# Patient Record
Sex: FEMALE | Race: WHITE | NOT HISPANIC OR LATINO | Employment: OTHER | URBAN - METROPOLITAN AREA
[De-identification: names, ages, dates, MRNs, and addresses within clinical notes are randomized per-mention and may not be internally consistent; named-entity substitution may affect disease eponyms.]

---

## 2018-04-05 PROBLEM — E03.9 HYPOTHYROIDISM IN ADULT: Status: ACTIVE | Noted: 2018-04-05

## 2018-04-05 PROBLEM — K22.2 ESOPHAGEAL STRICTURE: Status: ACTIVE | Noted: 2018-04-05

## 2018-04-05 PROBLEM — E55.9 VITAMIN D DEFICIENCY: Status: ACTIVE | Noted: 2018-04-05

## 2018-08-17 PROBLEM — R13.14 DYSPHAGIA, PHARYNGOESOPHAGEAL: Status: ACTIVE | Noted: 2018-08-17

## 2019-11-21 PROBLEM — E03.9 HYPOTHYROIDISM IN ADULT: Chronic | Status: ACTIVE | Noted: 2018-04-05

## 2019-11-21 PROBLEM — M19.011 PRIMARY OSTEOARTHRITIS OF RIGHT SHOULDER: Status: ACTIVE | Noted: 2019-11-21

## 2019-11-21 PROBLEM — K22.2 ESOPHAGEAL STRICTURE: Chronic | Status: ACTIVE | Noted: 2018-04-05

## 2019-11-21 PROBLEM — E55.9 VITAMIN D DEFICIENCY: Chronic | Status: ACTIVE | Noted: 2018-04-05

## 2020-02-12 PROBLEM — R14.2 BELCHING: Status: ACTIVE | Noted: 2020-02-12

## 2020-02-12 PROBLEM — R11.0 NAUSEA IN ADULT: Status: ACTIVE | Noted: 2020-02-12

## 2020-03-02 PROBLEM — R93.3 ABNORMAL FINDING ON GI TRACT IMAGING: Status: ACTIVE | Noted: 2020-03-02

## 2020-03-12 ENCOUNTER — OFFICE VISIT (OUTPATIENT)
Dept: SURGERY | Facility: CLINIC | Age: 85
End: 2020-03-12
Payer: MEDICARE

## 2020-03-12 VITALS
HEART RATE: 63 BPM | WEIGHT: 140 LBS | SYSTOLIC BLOOD PRESSURE: 140 MMHG | DIASTOLIC BLOOD PRESSURE: 77 MMHG | HEIGHT: 62 IN | BODY MASS INDEX: 25.76 KG/M2

## 2020-03-12 DIAGNOSIS — R13.10 DYSPHAGIA, UNSPECIFIED TYPE: Primary | ICD-10-CM

## 2020-03-12 DIAGNOSIS — E55.9 VITAMIN D DEFICIENCY: ICD-10-CM

## 2020-03-12 DIAGNOSIS — I10 ESSENTIAL HYPERTENSION: ICD-10-CM

## 2020-03-12 DIAGNOSIS — E03.9 HYPOTHYROIDISM IN ADULT: ICD-10-CM

## 2020-03-12 DIAGNOSIS — E78.2 MIXED HYPERLIPIDEMIA: ICD-10-CM

## 2020-03-12 DIAGNOSIS — F01.50 MIXED ALZHEIMER'S AND VASCULAR DEMENTIA: ICD-10-CM

## 2020-03-12 DIAGNOSIS — M15.9 PRIMARY OSTEOARTHRITIS INVOLVING MULTIPLE JOINTS: ICD-10-CM

## 2020-03-12 DIAGNOSIS — G30.9 MIXED ALZHEIMER'S AND VASCULAR DEMENTIA: ICD-10-CM

## 2020-03-12 DIAGNOSIS — F02.80 MIXED ALZHEIMER'S AND VASCULAR DEMENTIA: ICD-10-CM

## 2020-03-12 PROCEDURE — 99213 OFFICE O/P EST LOW 20 MIN: CPT | Mod: PBBFAC | Performed by: SURGERY

## 2020-03-12 PROCEDURE — 99999 PR PBB SHADOW E&M-EST. PATIENT-LVL III: ICD-10-PCS | Mod: PBBFAC,,, | Performed by: SURGERY

## 2020-03-12 PROCEDURE — 99205 OFFICE O/P NEW HI 60 MIN: CPT | Mod: S$PBB,,, | Performed by: SURGERY

## 2020-03-12 PROCEDURE — 99205 PR OFFICE/OUTPT VISIT, NEW, LEVL V, 60-74 MIN: ICD-10-PCS | Mod: S$PBB,,, | Performed by: SURGERY

## 2020-03-12 PROCEDURE — 99999 PR PBB SHADOW E&M-EST. PATIENT-LVL III: CPT | Mod: PBBFAC,,, | Performed by: SURGERY

## 2020-03-12 NOTE — PROGRESS NOTES
General Surgery  History and Physical Exam    Subjective:     HPI:  Jil Cotto is a 85 y.o. female who presents as a referral from Dr. Trino Rosado MD for possible achalasia. She has a history of dementia and comes with her daughter who aids in providing most of the history. She reports that since 2016 she has been having issues with swallowing and feeling of food getting stuck in her chest. She would occasionally have some belching with this as well. She underwent EGD with dilation in previously before 2016 and then again in 2016 x2, 2018, and most recently on 3/2/2020. These have helped her symptoms in the past, but the most recent one has not helped. She currently reports intermittent dysphagia with both solids and occasionally liquids. She gets the sensation that something is stuck in her throat and will become nauseated. She has not vomited from this, but because of her symptoms has had mild anorexia with an unintentional weight loss of about 4 lbs over the last 4 months. She also reports excessive belching which is her daughter feels is her most significant symptom.    Other than her most recent EGD with dilation, she has also had an esophagram which showed rapid tapering of the esophagus at the GE junction. She has not had manometry yet.     ROS:  - daughter assists in giving history of symptoms -   Gen: no fevers, no chills, +recent weight loss (4 lbs over last 4 month), +anorexia  CV: no palpitations, no peripheral edema  Respit: no cough, no SOB  Abd: no abd pain, no vomiting, +belching, +epigastric tightness after eating, +nausea  Skin: no rash, no wound  MSK: no back pain, +right shoulder pain  Endo: no excessive hunger or thirst  Heme: no lymphadenopathy  Neuro: no headache, no dizziness  Psych: no depression, no anxiety, +memory loss    Medical History    Current Outpatient Medications:     aspirin (ECOTRIN) 81 MG EC tablet, Take 81 mg by mouth once daily., Disp: , Rfl:      cyanocobalamin (VITAMIN B-12) 1000 MCG tablet, Take 100 mcg by mouth once daily., Disp: , Rfl:     diclofenac sodium (VOLTAREN) 1 % Gel, APPLY TO AFFECTED AREA 2-3 TIMES A DAY, Disp: 100 g, Rfl: 5    donepezil (ARICEPT) 10 MG tablet, Take 1 tablet (10 mg total) by mouth once daily., Disp: 90 tablet, Rfl: 3    ergocalciferol (VITAMIN D2) 50,000 unit Cap, Take 1 capsule (50,000 Units total) by mouth every 7 days., Disp: 12 capsule, Rfl: 3    levothyroxine (SYNTHROID) 50 MCG tablet, Take 1 tablet (50 mcg total) by mouth once daily., Disp: 90 tablet, Rfl: 6    memantine (NAMENDA) 10 MG Tab, Take 1 tablet (10 mg total) by mouth 2 (two) times daily., Disp: 180 tablet, Rfl: 3    metoprolol succinate (TOPROL-XL) 25 MG 24 hr tablet, Take 1 tablet (25 mg total) by mouth once daily., Disp: 30 tablet, Rfl: 11    omeprazole (PRILOSEC) 20 MG capsule, TAKE 1 CAPSULE BY MOUTH EVERY DAY, Disp: 90 capsule, Rfl: 2    ondansetron (ZOFRAN) 8 MG tablet, , Disp: , Rfl:     rosuvastatin (CRESTOR) 5 MG tablet, Take 1 tablet (5 mg total) by mouth every other day., Disp: 45 tablet, Rfl: 3    simethicone (GAS RELIEF EXTRA STRENGTH) 125 mg Cap capsule, Gas Relief Extra Strength, Disp: , Rfl:     tramadol-acetaminophen 37.5-325 mg (ULTRACET) 37.5-325 mg Tab, Take 1 tablet by mouth every 12 (twelve) hours as needed for Pain., Disp: 14 tablet, Rfl: 0    venlafaxine (EFFEXOR-XR) 37.5 MG 24 hr capsule, TAKE 1 CAPSULE BY MOUTH EVERY DAY, Disp: 90 capsule, Rfl: 3    Review of patient's allergies indicates:   Allergen Reactions    Anectine [succinylcholine chloride]        Past Medical History:   Diagnosis Date    Acid reflux     Dementia     Diverticulosis     Esophageal ring     requiring dilatation     Esophageal spasm     Gastritis     Hiatal hernia     Hyperlipidemia     Hypertension     Osteoarthritis     Pharyngoesophageal dysphagia      Past Surgical History:   Procedure Laterality Date     SECTION       ESOPHAGEAL DILATION      ESOPHAGOGASTRODUODENOSCOPY N/A 8/17/2018    Procedure: ESOPHAGOGASTRODUODENOSCOPY (EGD);  Surgeon: Trino Rosado MD;  Location: Replaced by [facility];  Service: Endoscopy;  Laterality: N/A;    ESOPHAGOGASTRODUODENOSCOPY N/A 3/2/2020    Procedure: ESOPHAGOGASTRODUODENOSCOPY (EGD);  Surgeon: Trino Rosado MD;  Location: [facility] ENDO;  Service: Endoscopy;  Laterality: N/A;    left shoulder replacement      TONSILLECTOMY       Family History     Problem Relation (Age of Onset)    Cancer Mother        Tobacco Use    Smoking status: Former Smoker    Smokeless tobacco: Never Used   Substance and Sexual Activity    Alcohol use: No    Drug use: No    Sexual activity: Not Currently       Objective:     Vitals:   Vitals:    03/12/20 1053   BP: (!) 140/77   Pulse: 63       Physical Exam:  Gen: well appearing, no acute distress  HEENT: normocephalic, EOMI  Neck: no tracheal deviation, no cervical LAD  CV: RRR  Respit: breathing non-labored  Abd: soft, non-tender, non-distended. belching  Extr: warm and well perfused  MSK: moves all extremities appropriately, guards right shoulder  Neuro: alert, CN II - XII grossly intact  Psych: normal mood and affect    Significant Diagnostics:  Reviewed    EGD 3/2/2020 with Dr. Trino Rosado  Findings:       One benign-appearing, intrinsic moderate stenosis was found 39 cm from the incisors. The stenosis was traversed. A guidewire was placed and the scope was withdrawn.       Dilation was performed with a savary dilator with no resistance at 19 mm.       The stomach was normal.       The examined duodenum was normal.  Impression:    - Benign-appearing esophageal stenosis. Dilated.                        - Normal stomach.                        - Normal examined duodenum.                        - No specimens collected.  Estimated Blood Loss: Estimated blood loss: none.  Recommendation:       - Discharge patient to home (ambulatory).                         - Consult Dr. Okeefe for possible achalasia and                         manometry.      Esophagram 2/18/2020  FINDINGS:  Swallowing mechanism is well maintained.  No detected evidence for aspiration.  There is abnormal esophageal peristalsis throughout this exam.  The esophagus distends with barium with rapid tapering to a point at the gastroesophageal junction.  No definite mucosal abnormality is identified.  The administered contrast does intermittently extend into the stomach, but there is abnormal pooling of contrast within the esophagus throughout this exam.      Impression       Diffuse abnormal esophageal peristalsis with rapid tapering of the caliber of the esophagus in the region of the GE junction.  This pattern suggests the presence of primary achalasia.  Localized stricture and/or a mass at the GE junction also considered in the differential.  Recommend follow-up with EGD to further assess.         Assessment/Plan:     Jil Cotto is a 85 y.o. female with possible achalasia.    Will proceed with further work up with manometry    Zo Ocampo MD  General Surgery, PGY-1  [phone]

## 2020-03-12 NOTE — LETTER
March 15, 2020      Trino Rosado MD  [address]  [address]  [place] [address]           [facility] General Surgery  [address]  [place] [address]  Phone: [phone]          Patient: Jil Cotto   MR Number: [id number]   YOB: 1935   Date of Visit: 3/12/2020       Dear Dr. Trino Rosado:    Thank you for referring Jil Cotto to me for evaluation. Attached you will find relevant portions of my assessment and plan of care.    If you have questions, please do not hesitate to call me. I look forward to following Jil Cotto along with you.    Sincerely,    Katya Okeefe MD    Enclosure  CC:  No Recipients    If you would like to receive this communication electronically, please contact [email or web address] or [phone] to request more information on [facility] access.    For providers and/or their staff who would like to refer a patient to [facility], please contact us through our one-stop-shop provider referral line, [facility] , at [phone].    If you feel you have received this communication in error or would no longer like to receive these types of communications, please e-mail [email or web address]

## 2020-05-01 ENCOUNTER — TELEPHONE (OUTPATIENT)
Dept: ENDOSCOPY | Facility: HOSPITAL | Age: 85
End: 2020-05-01

## 2020-05-01 NOTE — TELEPHONE ENCOUNTER
Left message for pt to call Endoscopy Scheduling to r/s Esophageal manometry scheduled on 5/20/20.

## 2020-05-12 DIAGNOSIS — U07.1 COVID-19: Primary | ICD-10-CM

## 2021-03-03 PROBLEM — E03.4 HYPOTHYROIDISM DUE TO ACQUIRED ATROPHY OF THYROID: Status: ACTIVE | Noted: 2018-04-05

## 2022-05-18 PROBLEM — R93.3 ABNORMAL FINDING ON GI TRACT IMAGING: Status: RESOLVED | Noted: 2020-03-02 | Resolved: 2022-05-18

## 2022-05-18 PROBLEM — R11.0 NAUSEA IN ADULT: Status: RESOLVED | Noted: 2020-02-12 | Resolved: 2022-05-18

## 2022-05-18 PROBLEM — M19.011 PRIMARY OSTEOARTHRITIS OF RIGHT SHOULDER: Status: RESOLVED | Noted: 2019-11-21 | Resolved: 2022-05-18

## 2022-05-18 PROBLEM — R13.14 DYSPHAGIA, PHARYNGOESOPHAGEAL: Status: RESOLVED | Noted: 2018-08-17 | Resolved: 2022-05-18

## 2022-05-18 PROBLEM — R14.2 BELCHING: Status: RESOLVED | Noted: 2020-02-12 | Resolved: 2022-05-18
